# Patient Record
Sex: FEMALE | Race: WHITE | Employment: PART TIME | URBAN - METROPOLITAN AREA
[De-identification: names, ages, dates, MRNs, and addresses within clinical notes are randomized per-mention and may not be internally consistent; named-entity substitution may affect disease eponyms.]

---

## 2017-02-17 ENCOUNTER — OFFICE VISIT (OUTPATIENT)
Dept: FAMILY MEDICINE CLINIC | Facility: CLINIC | Age: 27
End: 2017-02-17

## 2017-02-17 VITALS
DIASTOLIC BLOOD PRESSURE: 64 MMHG | TEMPERATURE: 98 F | HEIGHT: 60 IN | BODY MASS INDEX: 23.56 KG/M2 | WEIGHT: 120 LBS | SYSTOLIC BLOOD PRESSURE: 108 MMHG | RESPIRATION RATE: 23 BRPM | OXYGEN SATURATION: 99 % | HEART RATE: 99 BPM

## 2017-02-17 DIAGNOSIS — H61.23 BILATERAL IMPACTED CERUMEN: Primary | ICD-10-CM

## 2017-02-17 PROCEDURE — 69209 REMOVE IMPACTED EAR WAX UNI: CPT | Performed by: NURSE PRACTITIONER

## 2017-02-17 NOTE — PROGRESS NOTES
CHIEF COMPLAINT:   Patient presents with:  Ear Problem: trouble hearing from both ears x 3 dys       HPI:   Aron Chris is a 32year old female who presents to clinic today with complaints of bilateral ear problem R>L . Has had for 3  days.  Pain is d EARS: bilateral tragus non tender with manipulation. External auditory canals blocked with cerumen, after removal. Right TM: mild erythema, no bulging, no retraction, noeffusion, bony landmarks visible.   Left TM: mild erythema, no bulging, no retraction,n Healthcare providers can remove earwax safely. It is important to stay still during the procedure to avoid damage to the ear canal. But removing earwax generally doesn’t hurt.  You will not usually need anesthesia or pain medicine when the provider removes · Place the recommended number of drops in the ear. Wet a cotton ball with the medicine. Gently put the cotton ball into the ear opening. Follow-up care  Follow up with your healthcare provider, or as directed.   When to seek medical advice  Call the provi Earwax can build up because of many health conditions. Some cause a physical blockage. Others cause too much earwax to be made.  Health conditions that can cause earwax buildup include:  · Bony blockage in the ear (osteoma or exostoses)  · Infections, such Talk with your health care provider about which risks apply most to you. Don’t use these at home  Health care providers do not advise use of ear candles or ear vacuum kits. These methods are not shown to work.    Preventing impacted earwax  You may not be · Lean over a sink or basin with the blocked ear turned downward. Use a rubber bulb syringe filled with warm (not hot or cold) water to rinse the ear several times. Use gentle pressure only.  You may need to repeat the irrigation several times before the wa

## 2017-02-17 NOTE — PATIENT INSTRUCTIONS
Earwax Removal    The ear canal makes earwax from the canal’s lining. The ears make wax to lubricate and protect the ear canal. The ear canal is the tube that connects the middle ear to the outside of the ear.  The wax protects the ear from bacteria, infe · Don’t use cotton swabs in your ears. Cotton swabs may push wax deeper into the ear canal or damage the eardrum.  Use cotton gauze or a wet washcloth  to gently remove wax on the outside of the ear and around the opening to the ear canal.  · Don't use any Impacted Earwax  Impacted earwax is a buildup of the natural wax in the ear (cerumen). Impacted earwax is very common. It can cause symptoms such as hearing loss. It can also stop a doctor doing an exam of your ear.   Understanding earwax  Tiny glands · Ringing in the ears  · Cough  Treatment for impacted earwax  If you don’t have symptoms, you may not need treatment. Often the earwax goes away on its own with time. If you have symptoms, you may have 1 or more treatments such as:  · Ear drops.  These hel Everyone produces earwax from the lining of the ear canal. It lubricates and protects the ear. The wax that forms in the canal slowly moves toward the outside of the ear and falls out.  Sometimes wax can build up in the ear canal. This can cause a blockage Follow up with your healthcare provider, or as advised.   When to seek medical advice  Call your healthcare provider right away if any of these occur:  · Worsening ear pain  · Fever of 100.4°F (38°C) or higher, or as directed by your healthcare provider  ·

## 2017-11-13 ENCOUNTER — HOSPITAL ENCOUNTER (OUTPATIENT)
Age: 27
Discharge: HOME | End: 2017-11-13
Payer: COMMERCIAL

## 2017-11-13 VITALS
OXYGEN SATURATION: 100 % | WEIGHT: 125 LBS | HEART RATE: 102 BPM | TEMPERATURE: 98 F | SYSTOLIC BLOOD PRESSURE: 114 MMHG | BODY MASS INDEX: 24.54 KG/M2 | HEIGHT: 60 IN | DIASTOLIC BLOOD PRESSURE: 78 MMHG | RESPIRATION RATE: 16 BRPM

## 2017-11-13 DIAGNOSIS — H61.21 IMPACTED CERUMEN OF RIGHT EAR: Primary | ICD-10-CM

## 2017-11-13 DIAGNOSIS — H60.501 ACUTE OTITIS EXTERNA OF RIGHT EAR, UNSPECIFIED TYPE: ICD-10-CM

## 2017-11-13 DIAGNOSIS — H93.11 TINNITUS OF RIGHT EAR: ICD-10-CM

## 2017-11-13 PROCEDURE — 99213 OFFICE O/P EST LOW 20 MIN: CPT

## 2017-11-13 PROCEDURE — 99214 OFFICE O/P EST MOD 30 MIN: CPT

## 2017-11-13 PROCEDURE — 69209 REMOVE IMPACTED EAR WAX UNI: CPT

## 2017-11-13 RX ORDER — NEOMYCIN SULFATE, POLYMYXIN B SULFATE, HYDROCORTISONE 3.5; 10000; 1 MG/ML; [USP'U]/ML; MG/ML
3 SOLUTION/ DROPS AURICULAR (OTIC) 3 TIMES DAILY
Qty: 1 BOTTLE | Refills: 0 | Status: SHIPPED | OUTPATIENT
Start: 2017-11-13 | End: 2017-11-23

## 2017-11-13 NOTE — ED PROVIDER NOTES
Patient Seen in: THE [facility] [place]    History   Patient presents with:  Ear Problem Pain (neurosensory)    Stated Complaint: RINGING IN EAR Mago GALARZA    68-year-old female here with complaint of tinnitus/ringing in her ear that she ear and ear canal normal.   Nose: Nose normal.   Mouth/Throat: Oropharynx is clear and moist.   Right ear: cerumen impaction   Eyes: Conjunctivae and EOM are normal. Pupils are equal, round, and reactive to light. Neck: Normal range of motion.  Neck suppl Prescribed:  Current Discharge Medication List    START taking these medications    Neomycin-Polymyxin-HC 1 % Otic Solution  Place 3 drops in ear(s) 3 (three) times daily.   Qty: 1 Bottle Refills: 0

## 2017-11-13 NOTE — ED INITIAL ASSESSMENT (HPI)
C/O right ear ringing x1 week. Sts that it started after her head was massaged, but unsure if that is what caused it.

## 2018-02-08 ENCOUNTER — OFFICE VISIT (OUTPATIENT)
Dept: FAMILY MEDICINE CLINIC | Facility: CLINIC | Age: 28
End: 2018-02-08

## 2018-02-08 VITALS
SYSTOLIC BLOOD PRESSURE: 100 MMHG | OXYGEN SATURATION: 98 % | HEART RATE: 102 BPM | RESPIRATION RATE: 16 BRPM | WEIGHT: 124.81 LBS | HEIGHT: 59 IN | TEMPERATURE: 98 F | BODY MASS INDEX: 25.16 KG/M2 | DIASTOLIC BLOOD PRESSURE: 70 MMHG

## 2018-02-08 DIAGNOSIS — Z02.0 ENCOUNTER FOR SCHOOL HISTORY AND PHYSICAL EXAMINATION: Primary | ICD-10-CM

## 2018-02-08 PROCEDURE — 99395 PREV VISIT EST AGE 18-39: CPT | Performed by: NURSE PRACTITIONER

## 2018-02-08 NOTE — PROGRESS NOTES
Keyur Bowman is a 32year old female who presents for a complete physical exam, no gyn. HPI:     Patient presents with:  Sports Physical: Vision R.20/30   L.20/30 w/o aid. Patient feels well, dental visit up to date, no hearing problem.   Narciso CARDIOVASCULAR: denies chest pain, SOB, edema,orthopnea, no palpitations   GI: denies nausea, vomiting, constipation, diarrhea; no rectal bleeding; no heartburn  GENITAL/: no dysuria, urgency or frequency  MUSCULOSKELETAL: no joint complaints upper or Health maintenance. The patient indicates understanding of these issues and agrees to the plan. The patient is asked to return for CPX in 1 year.

## 2018-02-11 ENCOUNTER — CHARTING TRANS (OUTPATIENT)
Dept: OTHER | Age: 28
End: 2018-02-11

## 2018-02-13 ENCOUNTER — CHARTING TRANS (OUTPATIENT)
Dept: OTHER | Age: 28
End: 2018-02-13

## 2018-02-16 ENCOUNTER — OFFICE VISIT (OUTPATIENT)
Dept: FAMILY MEDICINE CLINIC | Facility: CLINIC | Age: 28
End: 2018-02-16

## 2018-02-16 ENCOUNTER — APPOINTMENT (OUTPATIENT)
Dept: LAB | Age: 28
End: 2018-02-16
Attending: FAMILY MEDICINE
Payer: COMMERCIAL

## 2018-02-16 VITALS
HEART RATE: 106 BPM | WEIGHT: 126 LBS | HEIGHT: 59.25 IN | RESPIRATION RATE: 16 BRPM | SYSTOLIC BLOOD PRESSURE: 106 MMHG | DIASTOLIC BLOOD PRESSURE: 70 MMHG | TEMPERATURE: 98 F | BODY MASS INDEX: 25.4 KG/M2

## 2018-02-16 DIAGNOSIS — Z28.3 INCOMPLETE IMMUNIZATION STATUS: Primary | ICD-10-CM

## 2018-02-16 DIAGNOSIS — Z28.3 INCOMPLETE IMMUNIZATION STATUS: ICD-10-CM

## 2018-02-16 PROBLEM — G43.909 MIGRAINE WITHOUT STATUS MIGRAINOSUS, NOT INTRACTABLE: Status: ACTIVE | Noted: 2018-02-16

## 2018-02-16 PROBLEM — G43.D0 ABDOMINAL MIGRAINE, NOT INTRACTABLE: Status: ACTIVE | Noted: 2018-02-16

## 2018-02-16 LAB
HBV SURFACE AB SER QL: NONREACTIVE
HBV SURFACE AB SERPL IA-ACNC: <3.1 MIU/ML
HBV SURFACE AG SERPL QL IA: NONREACTIVE
HEPATITIS B SURFACE ANTIGEN INDEX: <0.1

## 2018-02-16 PROCEDURE — 99213 OFFICE O/P EST LOW 20 MIN: CPT | Performed by: NURSE PRACTITIONER

## 2018-02-16 PROCEDURE — 87340 HEPATITIS B SURFACE AG IA: CPT | Performed by: NURSE PRACTITIONER

## 2018-02-16 PROCEDURE — 86706 HEP B SURFACE ANTIBODY: CPT | Performed by: NURSE PRACTITIONER

## 2018-02-16 PROCEDURE — 86787 VARICELLA-ZOSTER ANTIBODY: CPT | Performed by: NURSE PRACTITIONER

## 2018-02-16 PROCEDURE — 36415 COLL VENOUS BLD VENIPUNCTURE: CPT | Performed by: NURSE PRACTITIONER

## 2018-02-16 NOTE — PROGRESS NOTES
[facility] Office Note  Chief Complaint:   Patient presents with:  Physical: Needs titers drawn    HPI:   This is a 32year old female coming in for  HPI    Just here today for titers.   Is going to college at [place] [employer] for xr Constitutional: She is oriented to person, place, and time. Vital signs are normal. She appears well-developed and well-nourished. HENT:   Head: Normocephalic and atraumatic.    Right Ear: Hearing and tympanic membrane normal.   Left Ear: Hearing and ty migrainosus, not intractable     Abdominal migraine, not intractable      OFFICE/OUTPT VISIT,EST,LEVL III  Depression Screening (PHQ-2/PHQ-9): Over the LAST 2 WEEKS   Little interest or pleasure in doing things (over the last two weeks)?: Not at all    Fee

## 2018-02-16 NOTE — PATIENT INSTRUCTIONS
Thank you for choosing [facility] [place] [address]  To Do: Lamin Odom  1. Go to the lab for titers    [facility] is located in [address]. Monday, Tuesday & Friday – 8 a.m. to 4 p.m. Wednesday, Thursday – 7 a.m. to 3 p.m. outweigh those potential risks and we strive to make you healthier and to improve your quality of life.     Referrals, and Radiology Information:    If your insurance requires a referral to a specialist, please allow 5 business days to process your referral

## 2018-02-19 ENCOUNTER — CHARTING TRANS (OUTPATIENT)
Dept: OTHER | Age: 28
End: 2018-02-19

## 2018-02-20 LAB
VZV IGG SER IA-ACNC: POSITIVE
VZV IGG SER IA-ACNC: POSITIVE

## 2018-02-21 ENCOUNTER — TELEPHONE (OUTPATIENT)
Dept: FAMILY MEDICINE CLINIC | Facility: CLINIC | Age: 28
End: 2018-02-21

## 2018-02-21 ENCOUNTER — CHARTING TRANS (OUTPATIENT)
Dept: OTHER | Age: 28
End: 2018-02-21

## 2018-02-21 DIAGNOSIS — Z23 NEED FOR VACCINATION: Primary | ICD-10-CM

## 2018-02-21 NOTE — TELEPHONE ENCOUNTER
Patient had hep B titer drawn 2/16/18      Has the following nurse visit scheduled for Hep B injection:     Future Appointments  Date Time Provider Ana Allen   2/22/2018 10:30 AM [facility] 20 NURSE [facility] 20 [facility] [address]       Can you please review results

## 2018-02-22 ENCOUNTER — NURSE ONLY (OUTPATIENT)
Dept: FAMILY MEDICINE CLINIC | Facility: CLINIC | Age: 28
End: 2018-02-22

## 2018-02-22 DIAGNOSIS — Z23 NEED FOR HEPATITIS B VACCINATION: ICD-10-CM

## 2018-02-22 PROCEDURE — 90471 IMMUNIZATION ADMIN: CPT | Performed by: NURSE PRACTITIONER

## 2018-02-22 PROCEDURE — 90746 HEPB VACCINE 3 DOSE ADULT IM: CPT | Performed by: NURSE PRACTITIONER

## 2018-02-26 ENCOUNTER — TELEPHONE (OUTPATIENT)
Dept: FAMILY MEDICINE CLINIC | Facility: CLINIC | Age: 28
End: 2018-02-26

## 2018-02-26 NOTE — TELEPHONE ENCOUNTER
Patient received 1st Hep B on 2/22/18. 2nd injection needs to be 3/24/18 (this is Saturday so next business day 3/26/18) or later.  Patient has the following appt scheduled:     Future Appointments  Date Time Provider Ana Allen   3/22/2018 10:00 AM

## 2018-02-27 NOTE — TELEPHONE ENCOUNTER
Future Appointments  Date Time Provider Ana Allen   3/27/2018 10:00 AM EM[facility] 20 NURSE [facility] 20 [facility] [address]

## 2018-03-20 ENCOUNTER — TELEPHONE (OUTPATIENT)
Dept: FAMILY MEDICINE CLINIC | Facility: CLINIC | Age: 28
End: 2018-03-20

## 2018-03-20 DIAGNOSIS — Z23 NEED FOR VACCINATION: Primary | ICD-10-CM

## 2018-03-20 NOTE — TELEPHONE ENCOUNTER
Patient received the following immunizations:     Immunizations                HEP B, Adult 2/22/2018            Has the following nurse visit scheduled:     Future Appointments  Date Time Provider Ana Allen   3/27/2018 10:00 AM [facility] 20 NURSE [facility] 20

## 2018-03-29 ENCOUNTER — OFFICE VISIT (OUTPATIENT)
Dept: FAMILY MEDICINE CLINIC | Facility: CLINIC | Age: 28
End: 2018-03-29

## 2018-03-29 VITALS
SYSTOLIC BLOOD PRESSURE: 100 MMHG | WEIGHT: 129 LBS | RESPIRATION RATE: 16 BRPM | HEART RATE: 92 BPM | BODY MASS INDEX: 24.35 KG/M2 | HEIGHT: 61 IN | OXYGEN SATURATION: 100 % | DIASTOLIC BLOOD PRESSURE: 74 MMHG | TEMPERATURE: 99 F

## 2018-03-29 DIAGNOSIS — R30.0 DYSURIA: Primary | ICD-10-CM

## 2018-03-29 LAB
BILIRUBIN: NEGATIVE
GLUCOSE (URINE DIPSTICK): NEGATIVE MG/DL
KETONES (URINE DIPSTICK): NEGATIVE MG/DL
MULTISTIX LOT#: ABNORMAL NUMERIC
NITRITE, URINE: NEGATIVE
PH, URINE: 8.5 (ref 4.5–8)
PROTEIN (URINE DIPSTICK): NEGATIVE MG/DL
SPECIFIC GRAVITY: 1.02 (ref 1–1.03)
URINE-COLOR: YELLOW
UROBILINOGEN,SEMI-QN: 1 MG/DL (ref 0–1.9)

## 2018-03-29 PROCEDURE — 81003 URINALYSIS AUTO W/O SCOPE: CPT | Performed by: NURSE PRACTITIONER

## 2018-03-29 PROCEDURE — 99213 OFFICE O/P EST LOW 20 MIN: CPT | Performed by: NURSE PRACTITIONER

## 2018-03-29 RX ORDER — NITROFURANTOIN 25; 75 MG/1; MG/1
100 CAPSULE ORAL 2 TIMES DAILY
Qty: 14 CAPSULE | Refills: 0 | Status: SHIPPED | OUTPATIENT
Start: 2018-03-29 | End: 2018-04-05

## 2018-03-29 NOTE — PROGRESS NOTES
Hina Wasserman is a 32year old female. HPI:   Patient presents with urinary symptoms. Complaining of urinary frequency, urgency, dysuria. Denies back pain, fever, hematuria.   Duration: 2 days  Sexual activity: denies  Vaginal discharge: mild, had yeast AUTO, W/O SCOPE   Collection Time: 03/29/18 10:15 AM   Result Value Ref Range   GLUCOSE (URINE DIPSTICK) Negative mg/dL   BILIRUBIN Negative Negative   KETONES (URINE DIPSTICK) Negative Negative mg/dL   SPECIFIC GRAVITY 1.020 1.005 - 1.030   OCCULT BLOOD S

## 2018-04-02 ENCOUNTER — NURSE ONLY (OUTPATIENT)
Dept: FAMILY MEDICINE CLINIC | Facility: CLINIC | Age: 28
End: 2018-04-02

## 2018-04-02 DIAGNOSIS — Z23 NEED FOR HEPATITIS B VACCINATION: ICD-10-CM

## 2018-04-02 PROCEDURE — 90471 IMMUNIZATION ADMIN: CPT | Performed by: FAMILY MEDICINE

## 2018-04-02 PROCEDURE — 90746 HEPB VACCINE 3 DOSE ADULT IM: CPT | Performed by: FAMILY MEDICINE

## 2018-04-02 NOTE — PROGRESS NOTES
Patient is here for Hepatitis B vaccine #2. Given IM to left deltoid without incident.   Due for last shot in August.

## 2018-07-30 ENCOUNTER — TELEPHONE (OUTPATIENT)
Dept: FAMILY MEDICINE CLINIC | Facility: CLINIC | Age: 28
End: 2018-07-30

## 2018-07-30 DIAGNOSIS — Z23 NEED FOR VACCINATION: Primary | ICD-10-CM

## 2018-07-30 NOTE — TELEPHONE ENCOUNTER
Patient received the following immunizations:     <=\"\">  Immunizations                HEP B, Adult 4/2/2018, 2/22/2018               Has the following nurse visit scheduled:     Future Appointments  Date Time Provider Ana Allen   8/10/2018 10:00

## 2018-08-10 ENCOUNTER — NURSE ONLY (OUTPATIENT)
Dept: FAMILY MEDICINE CLINIC | Facility: CLINIC | Age: 28
End: 2018-08-10
Payer: COMMERCIAL

## 2018-08-10 DIAGNOSIS — Z23 NEED FOR VACCINATION: ICD-10-CM

## 2018-08-10 PROCEDURE — 90746 HEPB VACCINE 3 DOSE ADULT IM: CPT | Performed by: FAMILY MEDICINE

## 2018-08-10 PROCEDURE — 90471 IMMUNIZATION ADMIN: CPT | Performed by: FAMILY MEDICINE

## 2018-08-10 NOTE — PROGRESS NOTES
IM to right deltoid, patient tolerated well. Patient requested immunization record and this was provided.

## 2019-01-29 ENCOUNTER — WALK IN (OUTPATIENT)
Dept: URGENT CARE | Age: 29
End: 2019-01-29

## 2019-01-29 DIAGNOSIS — Z11.1 SCREENING-PULMONARY TB: Primary | ICD-10-CM

## 2019-01-29 PROCEDURE — 86580 TB INTRADERMAL TEST: CPT | Performed by: OBSTETRICS & GYNECOLOGY

## 2019-01-31 ENCOUNTER — WALK IN (OUTPATIENT)
Dept: URGENT CARE | Age: 29
End: 2019-01-31

## 2019-01-31 DIAGNOSIS — Z11.1 ENCOUNTER FOR PPD SKIN TEST READING: Primary | ICD-10-CM

## 2019-01-31 LAB
INDURATION: 0 MM (ref 0–?)
SKIN TEST RESULT: NEGATIVE

## 2019-01-31 PROCEDURE — X1094 NO CHARGE VISIT: HCPCS | Performed by: NURSE PRACTITIONER

## 2019-02-20 ENCOUNTER — TELEPHONE (OUTPATIENT)
Dept: SCHEDULING | Age: 29
End: 2019-02-20

## 2019-04-17 ENCOUNTER — HOSPITAL (OUTPATIENT)
Dept: OTHER | Age: 29
End: 2019-04-17
Attending: EMERGENCY MEDICINE

## 2019-04-17 LAB
ALBUMIN SERPL-MCNC: 3.9 GM/DL (ref 3.6–5.1)
ALBUMIN/GLOB SERPL: 1 {RATIO} (ref 1–2.4)
ALP SERPL-CCNC: 64 UNIT/L (ref 45–117)
ALT SERPL-CCNC: 33 UNIT/L
ANALYZER ANC (IANC): ABNORMAL
ANION GAP SERPL CALC-SCNC: 15 MMOL/L (ref 10–20)
AST SERPL-CCNC: 18 UNIT/L
BASOPHILS # BLD: 0 THOUSAND/MCL (ref 0–0.3)
BASOPHILS NFR BLD: 0 %
BILIRUB SERPL-MCNC: 0.2 MG/DL (ref 0.2–1)
BUN SERPL-MCNC: 9 MG/DL (ref 6–20)
BUN/CREAT SERPL: 11 (ref 7–25)
CALCIUM SERPL-MCNC: 9.4 MG/DL (ref 8.4–10.2)
CHLORIDE: 103 MMOL/L (ref 98–107)
CO2 SERPL-SCNC: 24 MMOL/L (ref 21–32)
CREAT SERPL-MCNC: 0.83 MG/DL (ref 0.51–0.95)
D DIMER PPP FEU-MCNC: 0.22 MG/L FEU
DIFFERENTIAL METHOD BLD: ABNORMAL
EOSINOPHIL # BLD: 0 THOUSAND/MCL (ref 0.1–0.5)
EOSINOPHIL NFR BLD: 0 %
ERYTHROCYTE [DISTWIDTH] IN BLOOD: 12.7 % (ref 11–15)
GLOBULIN SER-MCNC: 4.1 GM/DL (ref 2–4)
GLUCOSE SERPL-MCNC: 151 MG/DL (ref 65–99)
HCG POINT OF CARE (5HGRST): NEGATIVE
HEMATOCRIT: 39.9 % (ref 36–46.5)
HGB BLD-MCNC: 13.2 GM/DL (ref 12–15.5)
IMM GRANULOCYTES # BLD AUTO: 0 THOUSAND/MCL (ref 0–0.2)
IMM GRANULOCYTES NFR BLD: 0 %
LYMPHOCYTES # BLD: 2.2 THOUSAND/MCL (ref 1–4.8)
LYMPHOCYTES NFR BLD: 25 %
MAGNESIUM SERPL-MCNC: 1.9 MG/DL (ref 1.7–2.4)
MCH RBC QN AUTO: 29.9 PG (ref 26–34)
MCHC RBC AUTO-ENTMCNC: 33.1 GM/DL (ref 32–36.5)
MCV RBC AUTO: 90.5 FL (ref 78–100)
MONOCYTES # BLD: 0.3 THOUSAND/MCL (ref 0.3–0.9)
MONOCYTES NFR BLD: 4 %
NEUTROPHILS # BLD: 6.2 THOUSAND/MCL (ref 1.8–7.7)
NEUTROPHILS NFR BLD: 71 %
NEUTS SEG NFR BLD: ABNORMAL %
NRBC (NRBCRE): 0 /100 WBC
PLATELET # BLD: 295 THOUSAND/MCL (ref 140–450)
POTASSIUM SERPL-SCNC: 4.1 MMOL/L (ref 3.4–5.1)
PROT SERPL-MCNC: 8 GM/DL (ref 6.4–8.2)
RBC # BLD: 4.41 MILLION/MCL (ref 4–5.2)
SODIUM SERPL-SCNC: 138 MMOL/L (ref 135–145)
TROPONIN I SERPL HS-MCNC: <0.02 NG/ML
TSH SERPL-ACNC: 1.03 MCUNIT/ML (ref 0.35–5)
WBC # BLD: 8.7 THOUSAND/MCL (ref 4.2–11)

## 2019-04-23 ENCOUNTER — TELEPHONE (OUTPATIENT)
Dept: SCHEDULING | Age: 29
End: 2019-04-23

## 2019-07-09 ENCOUNTER — OFFICE VISIT (OUTPATIENT)
Dept: URGENT CARE | Age: 29
End: 2019-07-09

## 2019-07-09 VITALS
SYSTOLIC BLOOD PRESSURE: 110 MMHG | HEART RATE: 84 BPM | DIASTOLIC BLOOD PRESSURE: 80 MMHG | RESPIRATION RATE: 18 BRPM | TEMPERATURE: 98.4 F

## 2019-07-09 DIAGNOSIS — N30.00 ACUTE CYSTITIS WITHOUT HEMATURIA: Primary | ICD-10-CM

## 2019-07-09 LAB
APPEARANCE, POC: ABNORMAL
BILIRUBIN, POC: NEGATIVE
COLOR, POC: YELLOW
GLUCOSE UR-MCNC: NEGATIVE MG/DL
KETONES, POC: NEGATIVE
NITRITE, POC: NEGATIVE
OCCULT BLOOD, POC: NEGATIVE
PH UR: 8 [PH] (ref 5–7)
PROT UR-MCNC: NEGATIVE G/DL
SP GR UR: 1 (ref 1–1.03)
UROBILINOGEN UR-MCNC: 0.2 MG/DL (ref 0–1)
WBC (LEUKOCYTE) ESTERASE, POC: ABNORMAL

## 2019-07-09 PROCEDURE — 87086 URINE CULTURE/COLONY COUNT: CPT | Performed by: NURSE PRACTITIONER

## 2019-07-09 PROCEDURE — 99214 OFFICE O/P EST MOD 30 MIN: CPT | Performed by: NURSE PRACTITIONER

## 2019-07-09 PROCEDURE — 81002 URINALYSIS NONAUTO W/O SCOPE: CPT | Performed by: NURSE PRACTITIONER

## 2019-07-09 RX ORDER — NITROFURANTOIN 25; 75 MG/1; MG/1
100 CAPSULE ORAL 2 TIMES DAILY
Qty: 10 CAPSULE | Refills: 0 | Status: SHIPPED | OUTPATIENT
Start: 2019-07-09 | End: 2019-07-14

## 2019-07-09 RX ORDER — NORETHINDRONE ACETATE AND ETHINYL ESTRADIOL AND FERROUS FUMARATE 1MG-20(24)
1 KIT ORAL DAILY
COMMUNITY

## 2019-07-09 RX ORDER — VENLAFAXINE HYDROCHLORIDE 37.5 MG/1
37.5 CAPSULE, EXTENDED RELEASE ORAL DAILY
COMMUNITY

## 2019-07-09 RX ORDER — NEBIVOLOL 10 MG/1
10 TABLET ORAL DAILY
COMMUNITY

## 2019-07-09 RX ORDER — ARIPIPRAZOLE 20 MG/1
20 TABLET ORAL DAILY
COMMUNITY

## 2019-07-09 SDOH — HEALTH STABILITY: MENTAL HEALTH: HOW OFTEN DO YOU HAVE A DRINK CONTAINING ALCOHOL?: NEVER

## 2019-07-09 ASSESSMENT — ENCOUNTER SYMPTOMS
CHILLS: 0
RESPIRATORY NEGATIVE: 1

## 2019-07-11 ENCOUNTER — TELEPHONE (OUTPATIENT)
Dept: URGENT CARE | Age: 29
End: 2019-07-11

## 2019-07-11 LAB
BACTERIA UR CULT: NORMAL
REPORT STATUS (RPT): NORMAL
SPECIMEN SOURCE: NORMAL

## 2022-03-02 ENCOUNTER — TELEPHONE (OUTPATIENT)
Dept: SCHEDULING | Age: 32
End: 2022-03-02

## 2022-03-03 ENCOUNTER — WALK IN (OUTPATIENT)
Dept: URGENT CARE | Age: 32
End: 2022-03-03

## 2022-03-03 VITALS
TEMPERATURE: 98.8 F | HEIGHT: 60 IN | RESPIRATION RATE: 18 BRPM | HEART RATE: 74 BPM | WEIGHT: 154 LBS | DIASTOLIC BLOOD PRESSURE: 68 MMHG | SYSTOLIC BLOOD PRESSURE: 120 MMHG | BODY MASS INDEX: 30.23 KG/M2

## 2022-03-03 DIAGNOSIS — N30.01 ACUTE CYSTITIS WITH HEMATURIA: Primary | ICD-10-CM

## 2022-03-03 PROCEDURE — 99213 OFFICE O/P EST LOW 20 MIN: CPT | Performed by: NURSE PRACTITIONER

## 2022-03-03 PROCEDURE — 87086 URINE CULTURE/COLONY COUNT: CPT | Performed by: PSYCHIATRY & NEUROLOGY

## 2022-03-03 RX ORDER — NITROFURANTOIN 25; 75 MG/1; MG/1
100 CAPSULE ORAL 2 TIMES DAILY
Qty: 10 CAPSULE | Refills: 0 | Status: SHIPPED | OUTPATIENT
Start: 2022-03-03 | End: 2022-03-08

## 2022-03-03 ASSESSMENT — ENCOUNTER SYMPTOMS
GASTROINTESTINAL NEGATIVE: 1
CONSTITUTIONAL NEGATIVE: 1
RESPIRATORY NEGATIVE: 1
BACK PAIN: 1

## 2022-03-05 ENCOUNTER — TELEPHONE (OUTPATIENT)
Dept: URGENT CARE | Age: 32
End: 2022-03-05

## 2022-03-05 LAB — BACTERIA UR CULT: NORMAL

## 2023-11-20 ENCOUNTER — V-VISIT (OUTPATIENT)
Dept: URGENT CARE | Age: 33
End: 2023-11-20

## 2023-11-20 VITALS
DIASTOLIC BLOOD PRESSURE: 76 MMHG | OXYGEN SATURATION: 98 % | SYSTOLIC BLOOD PRESSURE: 117 MMHG | RESPIRATION RATE: 18 BRPM | TEMPERATURE: 98.4 F | WEIGHT: 164 LBS | BODY MASS INDEX: 32.2 KG/M2 | HEART RATE: 78 BPM | HEIGHT: 60 IN

## 2023-11-20 DIAGNOSIS — N30.00 ACUTE CYSTITIS WITHOUT HEMATURIA: Primary | ICD-10-CM

## 2023-11-20 LAB
APPEARANCE, POC: ABNORMAL
BILIRUBIN, POC: NEGATIVE
COLOR, POC: YELLOW
GLUCOSE UR-MCNC: NEGATIVE MG/DL
KETONES, POC: NEGATIVE MG/DL
NITRITE, POC: NEGATIVE
OCCULT BLOOD, POC: NEGATIVE
PH UR: 7 [PH] (ref 5–7)
PROT UR-MCNC: NEGATIVE MG/DL
SP GR UR: 1.03 (ref 1–1.03)
UROBILINOGEN UR-MCNC: 0.2 MG/DL (ref 0–1)
WBC (LEUKOCYTE) ESTERASE, POC: ABNORMAL

## 2023-11-20 PROCEDURE — 87086 URINE CULTURE/COLONY COUNT: CPT | Performed by: CLINICAL MEDICAL LABORATORY

## 2023-11-20 PROCEDURE — 99212 OFFICE O/P EST SF 10 MIN: CPT | Performed by: NURSE PRACTITIONER

## 2023-11-20 RX ORDER — CEPHALEXIN 500 MG/1
500 CAPSULE ORAL 2 TIMES DAILY
Qty: 14 CAPSULE | Refills: 0 | Status: SHIPPED | OUTPATIENT
Start: 2023-11-20 | End: 2023-11-27

## 2023-11-20 ASSESSMENT — PAIN SCALES - GENERAL: PAINLEVEL: 4

## 2023-11-20 ASSESSMENT — ENCOUNTER SYMPTOMS
CONSTITUTIONAL NEGATIVE: 1
RESPIRATORY NEGATIVE: 1

## 2023-11-22 ENCOUNTER — TELEPHONE (OUTPATIENT)
Dept: FAMILY MEDICINE | Age: 33
End: 2023-11-22

## 2023-11-22 LAB — BACTERIA UR CULT: NORMAL

## 2025-05-09 ENCOUNTER — OFFICE VISIT (OUTPATIENT)
Dept: FAMILY MEDICINE CLINIC | Facility: CLINIC | Age: 35
End: 2025-05-09
Payer: MEDICAID

## 2025-05-09 VITALS
WEIGHT: 174 LBS | DIASTOLIC BLOOD PRESSURE: 80 MMHG | RESPIRATION RATE: 20 BRPM | SYSTOLIC BLOOD PRESSURE: 120 MMHG | HEART RATE: 125 BPM | OXYGEN SATURATION: 98 % | BODY MASS INDEX: 33 KG/M2 | TEMPERATURE: 98 F

## 2025-05-09 DIAGNOSIS — Z20.828 EXPOSURE TO INFLUENZA: ICD-10-CM

## 2025-05-09 DIAGNOSIS — R50.9 LOW GRADE FEVER: Primary | ICD-10-CM

## 2025-05-09 PROCEDURE — 99202 OFFICE O/P NEW SF 15 MIN: CPT | Performed by: NURSE PRACTITIONER

## 2025-05-09 PROCEDURE — 87637 SARSCOV2&INF A&B&RSV AMP PRB: CPT | Performed by: NURSE PRACTITIONER

## 2025-05-09 NOTE — PROGRESS NOTES
CHIEF COMPLAINT:     Chief Complaint   Patient presents with    Flu     Low grade fever with cough   Exposure to influenza B       HPI:   Jamie Lorenzo is a 34 year old female who presents for low grade fevers for past year.  She is currently being worked up.  She presents today because she was exposed to influenza B and she wanted to be tested.  Denies any other symptoms, but feels she is having the low grade fevers more often.  Reports Tmax of 99's.  Treatments: none.   Patient also reports associated symptoms of:  mild cough    Travel: none  Sick exposure: yes, influenza B    Current Medications[1]   Past Medical History[2]   Past Surgical History[3]   Family History[4]   Short Social Hx on File[5]      REVIEW OF SYSTEMS:   GENERAL:   no change in activity level.  no appetite.   SKIN: no rashes, no skin wounds or ulcers.  EYES:denies blurred vision or double vision  HENT: no congestion, rhinorrhea, sore throat or ear pain  LUNGS: denies shortness of breath with exertion or rest. No cough or wheezing  GI: no abdominal pain, No N/V/C/D. No jaundice.  JOINTS: no arthralgia or swollen joints  NEURO: no headaches.      EXAM:   /80   Pulse (!) 125   Temp 98.2 °F (36.8 °C) (Oral)   Resp 20   Wt 174 lb (78.9 kg)   SpO2 98%   BMI 32.88 kg/m²   GENERAL: well developed, well nourished,in no apparent distress  SKIN: no rashes,no suspicious lesions  HEAD: atraumatic, normocephalic  EYES: conjunctiva clear, EOM intact  EARS: TM's without erythema, no bulging, no retraction,no fluid, bony landmarks visible  NOSE: nostrils patent, no exudates, nasal mucosa pink and noninflamed  THROAT: oral mucosa pink, moist. Posterior pharynx is not erythematous or injected. No exudates Tonsils 1/4.    NECK: supple, non-tender  LUNGS: clear to auscultation bilaterally, no wheezes or rhonchi. Breathing is non labored.  CARDIO: RRR without murmur  GI: BS's present x4, no masses, hepatosplenomegaly, or tenderness on direct  palpation  EXTREMITIES: no cyanosis, clubbing or edema  LYMPH: no cervical lymphadenopathy.      ASSESSMENT AND PLAN:   Jamie Lorenzo is a 34 year old female who presents with fever.    ASSESSMENT:  Encounter Diagnoses   Name Primary?    Low grade fever Yes    Exposure to influenza        PLAN: Quad panel sent.  Reassurance.  Increase fluids, Motrin/Tylenol prn, rest.  Patient is to follow up if fever greater than or equal to 100.4 persists for 72 hours.  Further instructions as listed in Patient Instructions.    There are no Patient Instructions on file for this visit.      The patient indicates understanding of these issues and agrees to the plan.  The patient is asked to return if sx's persist or worsen.         [1]   Current Outpatient Medications   Medication Sig Dispense Refill    Norethin Ace-Eth Estrad-FE (MINASTRIN 24 FE OR) Take  by mouth.      ARIPiprazole (ABILIFY OR) Take 20 mg by mouth.        Venlafaxine HCl ER (EFFEXOR) 37.5 MG Oral Tablet 24 Hr Take 37.5 mg by mouth daily.      PROTRIPTYLINE HCL OR Take 20 mg by mouth.      Nebivolol HCl (BYSTOLIC) 10 MG Oral Tab Take 10 mg by mouth daily.     [2]   Past Medical History:   Abdominal migraine    Migraine   [3]   Past Surgical History:  Procedure Laterality Date    Appendectomy  2/21/2014   [4]   Family History  Problem Relation Age of Onset    Heart Disorder Maternal Grandfather     Cancer Maternal Grandfather         colon cancer    Cancer Paternal Grandmother 40        breast     Heart Disorder Paternal Grandfather     Dementia Maternal Grandmother     Heart Disease Neg     Stroke Neg    [5]   Social History  Socioeconomic History    Marital status: Single   Tobacco Use    Smoking status: Never    Smokeless tobacco: Never   Substance and Sexual Activity    Alcohol use: No    Drug use: No     Social Drivers of Health     Food Insecurity: Not on File (9/26/2024)    Received from [facility]    Food Insecurity     Food: 0   Transportation Needs: Not on  File (10/7/2022)    Received from [employer]    Transportation Needs     Transportation: 0   Stress: Not on File (10/7/2022)    Received from [employer]    Stress     Stress: 0   Housing Stability: Not on File (10/7/2022)    Received from [employer]    Housing Stability     [place]ousin

## 2025-05-10 LAB
FLUAV + FLUBV RNA SPEC NAA+PROBE: NOT DETECTED
FLUAV + FLUBV RNA SPEC NAA+PROBE: NOT DETECTED
RSV RNA SPEC NAA+PROBE: NOT DETECTED
SARS-COV-2 RNA RESP QL NAA+PROBE: NOT DETECTED

## 2025-06-12 ENCOUNTER — APPOINTMENT (OUTPATIENT)
Dept: ENDOCRINOLOGY | Age: 35
End: 2025-06-12

## 2025-06-16 ENCOUNTER — APPOINTMENT (OUTPATIENT)
Dept: ENDOCRINOLOGY | Age: 35
End: 2025-06-16

## 2025-06-16 VITALS
WEIGHT: 176.81 LBS | BODY MASS INDEX: 34.71 KG/M2 | DIASTOLIC BLOOD PRESSURE: 85 MMHG | HEIGHT: 60 IN | OXYGEN SATURATION: 98 % | SYSTOLIC BLOOD PRESSURE: 129 MMHG | HEART RATE: 119 BPM | TEMPERATURE: 97.5 F

## 2025-06-16 DIAGNOSIS — D35.2 PITUITARY MICROADENOMA  (CMD): Primary | ICD-10-CM

## 2025-06-16 DIAGNOSIS — E55.9 VITAMIN D DEFICIENCY: ICD-10-CM

## 2025-06-16 DIAGNOSIS — K86.2 PANCREATIC CYST (CMD): ICD-10-CM

## 2025-06-16 PROCEDURE — 99204 OFFICE O/P NEW MOD 45 MIN: CPT | Performed by: PHYSICIAN ASSISTANT

## 2025-06-18 ENCOUNTER — EXTERNAL LAB (OUTPATIENT)
Dept: HEALTH INFORMATION MANAGEMENT | Facility: OTHER | Age: 35
End: 2025-06-18

## 2025-06-18 LAB
25(OH)D3+25(OH)D2 SERPL-MCNC: 39 NG/ML (ref 30–100)
ACTH PLAS-MCNC: 10 PG/ML (ref 6–50)
ALBUMIN SERPL-MCNC: 4.1 G/DL (ref 3.6–5.1)
ALBUMIN/GLOB SERPL: 1.4 (CALC) (ref 1–2.5)
ALP SERPL-CCNC: 76 U/L (ref 31–125)
ALT SERPL-CCNC: 18 U/L (ref 6–29)
AST SERPL-CCNC: 15 U/L (ref 10–30)
BILIRUB SERPL-MCNC: 0.4 MG/DL (ref 0.2–1.2)
BUN SERPL-MCNC: 8 MG/DL (ref 7–25)
BUN/CREAT SERPL: NORMAL (CALC) (ref 6–22)
CALCIUM SERPL-MCNC: 9.2 MG/DL (ref 8.6–10.2)
CHLORIDE SERPL-SCNC: 103 MMOL/L (ref 98–110)
CO2 SERPL-SCNC: 20 MMOL/L (ref 20–32)
CORTIS AM PEAK SERPL-MCNC: 16.4 MCG/DL
CREAT SERPL-MCNC: 0.73 MG/DL (ref 0.5–0.97)
GFR SERPLBLD SCHWARTZ-ARVRAT: 111 ML/MIN/1.73M2
GLOBULIN SER-MCNC: 3 G/DL (CALC) (ref 1.9–3.7)
GLUCOSE SERPL-MCNC: 90 MG/DL (ref 65–99)
LAB RESULT: NORMAL
LENGTH OF FAST TIME PATIENT: YES H
POTASSIUM SERPL-SCNC: 4.3 MMOL/L (ref 3.5–5.3)
PROLACTIN SERPL-MCNC: 14.8 NG/ML
PROT SERPL-MCNC: 7.1 G/DL (ref 6.1–8.1)
SODIUM SERPL-SCNC: 136 MMOL/L (ref 135–146)
T4 FREE SERPL-MCNC: 1.3 NG/DL (ref 0.8–1.8)
TSH SERPL-ACNC: 1.44 MIU/L

## 2025-06-19 LAB
25(OH)D3+25(OH)D2 SERPL-MCNC: 39 NG/ML (ref 30–100)
ACTH PLAS-MCNC: NORMAL PG/ML
ALBUMIN SERPL-MCNC: 4.1 G/DL (ref 3.6–5.1)
ALBUMIN/GLOB SERPL: 1.4 (CALC) (ref 1–2.5)
ALP SERPL-CCNC: 76 U/L (ref 31–125)
ALT SERPL-CCNC: 18 U/L (ref 6–29)
AST SERPL-CCNC: 15 U/L (ref 10–30)
BILIRUB SERPL-MCNC: 0.4 MG/DL (ref 0.2–1.2)
BUN SERPL-MCNC: 8 MG/DL (ref 7–25)
BUN/CREAT SERPL: NORMAL (CALC) (ref 6–22)
CALCIUM SERPL-MCNC: 9.2 MG/DL (ref 8.6–10.2)
CHLORIDE SERPL-SCNC: 103 MMOL/L (ref 98–110)
CO2 SERPL-SCNC: 20 MMOL/L (ref 20–32)
CORTIS AM PEAK SERPL-MCNC: 16.4 MCG/DL
CREAT SERPL-MCNC: 0.73 MG/DL (ref 0.5–0.97)
EGFRCR SERPLBLD CKD-EPI 2021: 111 ML/MIN/1.73M2
GLOBULIN SER CALC-MCNC: 3 G/DL (CALC) (ref 1.9–3.7)
GLUCOSE SERPL-MCNC: 90 MG/DL (ref 65–99)
IGF-I SERPL-MCNC: NORMAL NG/ML
IGF-I Z-SCORE SERPL: NORMAL
IGF-I Z-SCORE SERPL: NORMAL
POTASSIUM SERPL-SCNC: 4.3 MMOL/L (ref 3.5–5.3)
PROLACTIN SERPL-MCNC: 14.8 NG/ML
PROT SERPL-MCNC: 7.1 G/DL (ref 6.1–8.1)
SODIUM SERPL-SCNC: 136 MMOL/L (ref 135–146)
T4 FREE SERPL-MCNC: 1.3 NG/DL (ref 0.8–1.8)
TSH SERPL-ACNC: 1.44 MIU/L

## 2025-06-26 ENCOUNTER — RESULTS FOLLOW-UP (OUTPATIENT)
Dept: PEDIATRIC ENDOCRINOLOGY | Age: 35
End: 2025-06-26

## 2025-07-11 LAB — CORTIS SAL-MCNC: 0.14 MCG/DL

## (undated) NOTE — MR AVS SNAPSHOT
[facility]  [address] [facility]  [place] [address] [phone]               Thank you for choosing us for your health care visit with Warner Merlin, APN. We are glad to serve you and happy to provide you with this summary of your visit.   Ple · The healthcare provider may recommend mineral oil or an over-the-counter eardrop to use at home to soften the earwax. Use these products only if the provider recommends them. Use these products only if the provider recommends them.  Carefully follow the i · Signs of irritation from the eardrops, such as burning, stinging, or swelling and tenderness  · Foul-smelling fluid draining from the ear  · Swelling, redness, or tenderness of the outer ear  · Headache, neck pain, or stiff neck  Date Last Reviewed: 3/22 example, putting cotton swabs in the ear may push the wax deeper into the ear. Over time, this may cause blockage. Hearing aids, swimming plugs, and swim molds can cause the same problem when used again and again.   In some cases, the cause of impacted earw after earwax removal. These may include bleeding or severe ear pain. Date Last Reviewed: 3/19/2015  © 4062-[address], [address] [place] [address]. All rights reserved.  This information is not intended as a substitute fo · Don’t do this procedure if you have a punctured eardrum. · Don’t use cotton swabs, matches, hairpins, keys, or other objects to “clean” the ear canal. This can cause infection of the ear canal or rupture of the eardrum.  Because of their size and shape, Take 20 mg by mouth. Venlafaxine HCl ER 37.5 MG Tb24   Take 37.5 mg by mouth daily. Commonly known as:  EFFEXOR                   Follow-up Instructions     Return if symptoms worsen or fail to improve.          MyChart     Sign up for MyChart,